# Patient Record
Sex: MALE | Race: WHITE
[De-identification: names, ages, dates, MRNs, and addresses within clinical notes are randomized per-mention and may not be internally consistent; named-entity substitution may affect disease eponyms.]

---

## 2023-01-03 ENCOUNTER — HOSPITAL ENCOUNTER (OUTPATIENT)
Dept: HOSPITAL 75 - ENDO | Age: 63
Discharge: HOME | End: 2023-01-03
Attending: SURGERY
Payer: COMMERCIAL

## 2023-01-03 VITALS — DIASTOLIC BLOOD PRESSURE: 72 MMHG | SYSTOLIC BLOOD PRESSURE: 110 MMHG

## 2023-01-03 VITALS — SYSTOLIC BLOOD PRESSURE: 110 MMHG | DIASTOLIC BLOOD PRESSURE: 72 MMHG

## 2023-01-03 VITALS — DIASTOLIC BLOOD PRESSURE: 55 MMHG | SYSTOLIC BLOOD PRESSURE: 93 MMHG

## 2023-01-03 VITALS — BODY MASS INDEX: 29.57 KG/M2 | HEIGHT: 67.99 IN | WEIGHT: 195.11 LBS

## 2023-01-03 VITALS — SYSTOLIC BLOOD PRESSURE: 91 MMHG | DIASTOLIC BLOOD PRESSURE: 54 MMHG

## 2023-01-03 VITALS — DIASTOLIC BLOOD PRESSURE: 94 MMHG | SYSTOLIC BLOOD PRESSURE: 147 MMHG

## 2023-01-03 DIAGNOSIS — K63.5: ICD-10-CM

## 2023-01-03 DIAGNOSIS — Z28.310: ICD-10-CM

## 2023-01-03 DIAGNOSIS — K57.30: ICD-10-CM

## 2023-01-03 DIAGNOSIS — Z12.11: Primary | ICD-10-CM

## 2023-01-03 DIAGNOSIS — Z87.891: ICD-10-CM

## 2023-01-03 NOTE — DISCHARGE INST-SIMPLE/STANDARD
Discharge Inst-Standard


Patient Instructions/Follow Up


Plan of Care/Instructions/FU:  


2 weeks Rehan


Activity as Tolerated:  Yes


Discharge Diet:  Regular Diet (high fiber)











MITZY COX DO               Juan Carlos 3, 2023 09:36

## 2023-01-03 NOTE — OPERATIVE REPORT
DATE OF SERVICE: 01/03/2023



PREOPERATIVE DIAGNOSIS:

History of colon polyps.



POSTOPERATIVE DIAGNOSIS:

Colon polyp, diverticulosis.



PROCEDURE:

Colonoscopy with hot biopsy polypectomy x1.



SURGEON:

Mitzy Van DO.



ANESTHESIA:

Per CRNA.



ESTIMATED BLOOD LOSS:

None.



COMPLICATIONS:

None.



INDICATIONS:

The patient is a 62-year-old male with history of polyps.  He understands risks 

and benefits of procedure and wished to proceed.  Consent was signed in chart.



DESCRIPTION OF PROCEDURE:

The patient was taken to endoscopy suite, placed in left lateral recumbent 

position.  Timeout was performed.  Digital rectal exam was performed.  No 

palpable polyps, masses or ulcerations.  Scope was inserted into the rectum and 

advanced all the way to the cecum with minimal difficulty.  Prep was adequate.  

Scope was slowly retracted back.  No polyps, masses or ulcerations within the 

cecum, ascending, transverse, and descending colon.  In sigmoid colon, a small 

polyp was present, which hot biopsy polypectomy was performed.  Very minimal 

amount of diverticulosis present.  Scope was retracted back in the rectum 

without any other pathology.  The scope was retroflexed.  There was no other 

pathology.  Scope was returned to its normal position, slowly withdrawn until 

completely removed.  The patient tolerated the procedure well without 

complications, taken to recovery in stable condition.



RECOMMENDATIONS:

The patient will follow up in the office in 2 weeks.  Discussed pathology 

results.  Would recommend high fiber diet due to diverticulosis.  Repeat 

colonoscopy in 5 years.  Any issues before that be seen at that time.











Job ID: [id number]

DocumentID: [id number]

Dictated Date: 01/03/2023 09:38:19

Transcription Date: 01/03/2023 16:52:00

Dictated By: MITZY VAN DO

## 2023-01-03 NOTE — PROGRESS NOTE-PRE OPERATIVE
Pre-Operative Progress Note


Date of Available H&P:  Dec 12, 2022


Date H&P Reviewed:  Ujan Carlos 3, 2023


Time H&P Reviewed:  08:13


History & Physical:  H&P Reviewed, Patient Examed, No changes noted


Pre-Operative Diagnosis:  hx polyps











MITZY COX DO               Juan Carlos 3, 2023 08:14

## 2023-01-10 NOTE — ANESTHESIA-GENERAL POST-OP
MAC


Significant Intra-Op Events


Notes


1/3/23 @ 1000





Patient Condition


Mental Status/LOC:  Same as Preop


Cardiovascular:  Satisfactory


Nausea/Vomiting:  Absent


Respiratory:  Satisfactory


Pain:  Controlled


Complications:  Absent





Post Op Complications


Complications


None





Follow Up Care/Instructions


Patient Instructions


None needed.





Anesthesiology Discharge Order


Discharge Order


Patient is doing well, no complaints, stable vital signs, no apparent adverse 

anesthesia problems.   


No complications reported per nursing.











LISA CAVAZOS CRNA            Juan Carlos 10, 2023 14:48